# Patient Record
Sex: FEMALE | Race: BLACK OR AFRICAN AMERICAN | Employment: OTHER | URBAN - METROPOLITAN AREA
[De-identification: names, ages, dates, MRNs, and addresses within clinical notes are randomized per-mention and may not be internally consistent; named-entity substitution may affect disease eponyms.]

---

## 2017-07-11 ENCOUNTER — RECORDS - HEALTHEAST (OUTPATIENT)
Dept: LAB | Facility: CLINIC | Age: 37
End: 2017-07-11

## 2017-07-11 LAB
CHOLEST SERPL-MCNC: 212 MG/DL
FASTING STATUS PATIENT QL REPORTED: ABNORMAL
HDLC SERPL-MCNC: 72 MG/DL
LDLC SERPL CALC-MCNC: 120 MG/DL
TRIGL SERPL-MCNC: 98 MG/DL

## 2018-04-05 ENCOUNTER — RECORDS - HEALTHEAST (OUTPATIENT)
Dept: LAB | Facility: CLINIC | Age: 38
End: 2018-04-05

## 2018-04-06 LAB
BACTERIA SPEC CULT: NO GROWTH
C TRACH DNA SPEC QL PROBE+SIG AMP: NEGATIVE
N GONORRHOEA DNA SPEC QL NAA+PROBE: NEGATIVE

## 2018-04-09 LAB
BKR LAB AP ABNORMAL BLEEDING: NO
BKR LAB AP BIRTH CONTROL/HORMONES: NORMAL
BKR LAB AP CERVICAL APPEARANCE: NORMAL
BKR LAB AP GYN ADEQUACY: NORMAL
BKR LAB AP GYN INTERPRETATION: NORMAL
BKR LAB AP GYN OTHER FINDINGS: NORMAL
BKR LAB AP HPV REFLEX: NORMAL
BKR LAB AP LMP: NORMAL
BKR LAB AP PATIENT STATUS: NORMAL
BKR LAB AP PREVIOUS ABNORMAL: NORMAL
BKR LAB AP PREVIOUS NORMAL: NORMAL
HIGH RISK?: NO
PATH REPORT.COMMENTS IMP SPEC: NORMAL
RESULT FLAG (HE HISTORICAL CONVERSION): NORMAL

## 2019-04-01 ENCOUNTER — RECORDS - HEALTHEAST (OUTPATIENT)
Dept: LAB | Facility: CLINIC | Age: 39
End: 2019-04-01

## 2019-04-01 LAB
ANION GAP SERPL CALCULATED.3IONS-SCNC: 8 MMOL/L (ref 5–18)
BUN SERPL-MCNC: 11 MG/DL (ref 8–22)
CALCIUM SERPL-MCNC: 9.7 MG/DL (ref 8.5–10.5)
CHLORIDE BLD-SCNC: 106 MMOL/L (ref 98–107)
CHOLEST SERPL-MCNC: 191 MG/DL
CO2 SERPL-SCNC: 27 MMOL/L (ref 22–31)
CREAT SERPL-MCNC: 0.85 MG/DL (ref 0.6–1.1)
FASTING STATUS PATIENT QL REPORTED: NO
GFR SERPL CREATININE-BSD FRML MDRD: >60 ML/MIN/1.73M2
GLUCOSE BLD-MCNC: 79 MG/DL (ref 70–125)
HDLC SERPL-MCNC: 77 MG/DL
LDLC SERPL CALC-MCNC: 102 MG/DL
POTASSIUM BLD-SCNC: 4.5 MMOL/L (ref 3.5–5)
SODIUM SERPL-SCNC: 141 MMOL/L (ref 136–145)
TRIGL SERPL-MCNC: 58 MG/DL

## 2019-10-28 ENCOUNTER — AMBULATORY - HEALTHEAST (OUTPATIENT)
Dept: LAB | Facility: CLINIC | Age: 39
End: 2019-10-28

## 2019-10-28 DIAGNOSIS — I10 HYPERTENSION: ICD-10-CM

## 2019-10-28 LAB
CREAT 24H UR-MRATE: 1.7 G/24 HR (ref 0.8–1.8)
CREAT UR-MCNC: 192.5 MG/DL
PROTEIN TOTAL TIMED URINE MG/SPEC LHE: 105 MG/24HR (ref 0–150)
PROTEIN, RANDOM URINE - HISTORICAL: 12 MG/DL
SPECIMEN VOL UR: 875 ML
SPECIMEN VOL UR: 875 ML

## 2021-02-05 ENCOUNTER — RECORDS - HEALTHEAST (OUTPATIENT)
Dept: LAB | Facility: CLINIC | Age: 41
End: 2021-02-05

## 2021-02-05 LAB
CLUE CELLS: NORMAL
TRICHOMONAS, WET PREP: NORMAL
YEAST, WET PREP: NORMAL

## 2021-02-09 LAB
C TRACH DNA SPEC QL PROBE+SIG AMP: NEGATIVE
N GONORRHOEA DNA SPEC QL NAA+PROBE: NEGATIVE

## 2022-01-27 ENCOUNTER — LAB REQUISITION (OUTPATIENT)
Dept: LAB | Facility: CLINIC | Age: 42
End: 2022-01-27

## 2022-01-27 DIAGNOSIS — M25.462 EFFUSION, LEFT KNEE: ICD-10-CM

## 2022-01-27 LAB
ERYTHROCYTE [SEDIMENTATION RATE] IN BLOOD BY WESTERGREN METHOD: 23 MM/HR (ref 0–20)
URATE SERPL-MCNC: 3 MG/DL (ref 2–7.5)

## 2022-01-27 PROCEDURE — 85652 RBC SED RATE AUTOMATED: CPT | Performed by: PHYSICIAN ASSISTANT

## 2022-01-27 PROCEDURE — 84550 ASSAY OF BLOOD/URIC ACID: CPT | Performed by: PHYSICIAN ASSISTANT

## 2022-02-28 ENCOUNTER — LAB REQUISITION (OUTPATIENT)
Dept: LAB | Facility: CLINIC | Age: 42
End: 2022-02-28

## 2022-02-28 DIAGNOSIS — M25.562 PAIN IN LEFT KNEE: ICD-10-CM

## 2022-02-28 DIAGNOSIS — Z01.419 ENCOUNTER FOR GYNECOLOGICAL EXAMINATION (GENERAL) (ROUTINE) WITHOUT ABNORMAL FINDINGS: ICD-10-CM

## 2022-02-28 DIAGNOSIS — I10 ESSENTIAL (PRIMARY) HYPERTENSION: ICD-10-CM

## 2022-02-28 LAB
ANION GAP SERPL CALCULATED.3IONS-SCNC: 12 MMOL/L (ref 5–18)
BUN SERPL-MCNC: 9 MG/DL (ref 8–22)
CALCIUM SERPL-MCNC: 9.5 MG/DL (ref 8.5–10.5)
CHLORIDE BLD-SCNC: 107 MMOL/L (ref 98–107)
CHOLEST SERPL-MCNC: 194 MG/DL
CO2 SERPL-SCNC: 23 MMOL/L (ref 22–31)
CREAT SERPL-MCNC: 0.8 MG/DL (ref 0.6–1.1)
ERYTHROCYTE [SEDIMENTATION RATE] IN BLOOD BY WESTERGREN METHOD: 16 MM/HR (ref 0–20)
FASTING STATUS PATIENT QL REPORTED: NORMAL
GFR SERPL CREATININE-BSD FRML MDRD: >90 ML/MIN/1.73M2
GLUCOSE BLD-MCNC: 124 MG/DL (ref 70–125)
HDLC SERPL-MCNC: 62 MG/DL
LDLC SERPL CALC-MCNC: 115 MG/DL
POTASSIUM BLD-SCNC: 3.8 MMOL/L (ref 3.5–5)
RHEUMATOID FACT SER NEPH-ACNC: <15 IU/ML (ref 0–30)
SODIUM SERPL-SCNC: 142 MMOL/L (ref 136–145)
TRIGL SERPL-MCNC: 86 MG/DL

## 2022-02-28 PROCEDURE — 80048 BASIC METABOLIC PNL TOTAL CA: CPT | Performed by: PHYSICIAN ASSISTANT

## 2022-02-28 PROCEDURE — 85652 RBC SED RATE AUTOMATED: CPT | Performed by: PHYSICIAN ASSISTANT

## 2022-02-28 PROCEDURE — 86431 RHEUMATOID FACTOR QUANT: CPT | Performed by: PHYSICIAN ASSISTANT

## 2022-02-28 PROCEDURE — 80061 LIPID PANEL: CPT | Performed by: PHYSICIAN ASSISTANT

## 2022-02-28 PROCEDURE — 86038 ANTINUCLEAR ANTIBODIES: CPT | Performed by: PHYSICIAN ASSISTANT

## 2022-02-28 PROCEDURE — G0123 SCREEN CERV/VAG THIN LAYER: HCPCS | Performed by: PHYSICIAN ASSISTANT

## 2022-02-28 PROCEDURE — 87624 HPV HI-RISK TYP POOLED RSLT: CPT | Performed by: PHYSICIAN ASSISTANT

## 2022-03-02 LAB
ANA SER QL IF: NEGATIVE
HUMAN PAPILLOMA VIRUS 16 DNA: NEGATIVE
HUMAN PAPILLOMA VIRUS 18 DNA: NEGATIVE
HUMAN PAPILLOMA VIRUS FINAL DIAGNOSIS: NORMAL
HUMAN PAPILLOMA VIRUS OTHER HR: NEGATIVE

## 2022-03-04 LAB
BKR LAB AP GYN ADEQUACY: NORMAL
BKR LAB AP GYN INTERPRETATION: NORMAL
BKR LAB AP HPV REFLEX: NORMAL
BKR LAB AP LMP: NORMAL
BKR LAB AP PREVIOUS ABNORMAL: NORMAL
PATH REPORT.COMMENTS IMP SPEC: NORMAL
PATH REPORT.COMMENTS IMP SPEC: NORMAL
PATH REPORT.RELEVANT HX SPEC: NORMAL

## 2022-03-14 ENCOUNTER — TRANSCRIBE ORDERS (OUTPATIENT)
Dept: OTHER | Age: 42
End: 2022-03-14
Payer: COMMERCIAL

## 2022-03-14 DIAGNOSIS — M22.42 CHONDROMALACIA OF PATELLA, LEFT: ICD-10-CM

## 2022-03-14 DIAGNOSIS — M94.20 CHONDROMALACIA: Primary | ICD-10-CM

## 2022-03-23 ENCOUNTER — THERAPY VISIT (OUTPATIENT)
Dept: PHYSICAL THERAPY | Facility: CLINIC | Age: 42
End: 2022-03-23
Attending: PHYSICIAN ASSISTANT
Payer: COMMERCIAL

## 2022-03-23 DIAGNOSIS — M94.20 CHONDROMALACIA: ICD-10-CM

## 2022-03-23 DIAGNOSIS — M22.42 CHONDROMALACIA OF PATELLA, LEFT: ICD-10-CM

## 2022-03-23 PROCEDURE — 97110 THERAPEUTIC EXERCISES: CPT | Mod: GP | Performed by: PHYSICAL THERAPIST

## 2022-03-23 PROCEDURE — 97161 PT EVAL LOW COMPLEX 20 MIN: CPT | Mod: GP | Performed by: PHYSICAL THERAPIST

## 2022-03-23 ASSESSMENT — ACTIVITIES OF DAILY LIVING (ADL)
PAIN: THE SYMPTOM AFFECTS MY ACTIVITY MODERATELY
STIFFNESS: THE SYMPTOM AFFECTS MY ACTIVITY MODERATELY
SIT WITH YOUR KNEE BENT: ACTIVITY IS SOMEWHAT DIFFICULT
RISE FROM A CHAIR: ACTIVITY IS SOMEWHAT DIFFICULT
WALK: ACTIVITY IS FAIRLY DIFFICULT
HOW_WOULD_YOU_RATE_THE_OVERALL_FUNCTION_OF_YOUR_KNEE_DURING_YOUR_USUAL_DAILY_ACTIVITIES?: ABNORMAL
HOW_WOULD_YOU_RATE_THE_CURRENT_FUNCTION_OF_YOUR_KNEE_DURING_YOUR_USUAL_DAILY_ACTIVITIES_ON_A_SCALE_FROM_0_TO_100_WITH_100_BEING_YOUR_LEVEL_OF_KNEE_FUNCTION_PRIOR_TO_YOUR_INJURY_AND_0_BEING_THE_INABILITY_TO_PERFORM_ANY_OF_YOUR_USUAL_DAILY_ACTIVITIES?: 40
GIVING WAY, BUCKLING OR SHIFTING OF KNEE: THE SYMPTOM AFFECTS MY ACTIVITY MODERATELY
RAW_SCORE: 24
KNEE_ACTIVITY_OF_DAILY_LIVING_SCORE: 34.29
SWELLING: THE SYMPTOM AFFECTS MY ACTIVITY MODERATELY
GO UP STAIRS: ACTIVITY IS VERY DIFFICULT
LIMPING: THE SYMPTOM AFFECTS MY ACTIVITY MODERATELY
KNEEL ON THE FRONT OF YOUR KNEE: I AM UNABLE TO DO THE ACTIVITY
SQUAT: I AM UNABLE TO DO THE ACTIVITY
AS_A_RESULT_OF_YOUR_KNEE_INJURY,_HOW_WOULD_YOU_RATE_YOUR_CURRENT_LEVEL_OF_DAILY_ACTIVITY?: SEVERELY ABNORMAL
STAND: ACTIVITY IS FAIRLY DIFFICULT
KNEE_ACTIVITY_OF_DAILY_LIVING_SUM: 24
WEAKNESS: THE SYMPTOM AFFECTS MY ACTIVITY MODERATELY
GO DOWN STAIRS: ACTIVITY IS VERY DIFFICULT

## 2022-03-23 NOTE — PROGRESS NOTES
Physical Therapy Initial Evaluation  Subjective:  The history is provided by the patient. No  was used.   Therapist Generated HPI Evaluation  Problem details: 1/26/22. Pt started having L knee pain about 8 weeks ago. Denies specific REJI. Denies history of knee problems. Since onset of L knee pain, she has started experiencing R knee pain..         Type of problem:  Left knee.    This is a new condition.  Condition occurred with:  Insidious onset.  Where condition occurred: for unknown reasons.  Patient reports pain:  Anterior.  Pain is described as sharp and is intermittent.    Since onset symptoms are gradually improving.  Associated symptoms:  Other and edema (popping). Symptoms are exacerbated by ascending stairs, descending stairs, bending/squatting, other and walking (twisting/pivoting)  and relieved by rest and bracing/immobilizing.  Special tests included:  X-ray and MRI.    Restrictions due to condition include:  Working in normal job without restrictions.  Barriers include:  Stairs.    Patient Health History  Liat Jimenez being seen for L knee.          Pain is reported as 4/10 (up to 7/10) on pain scale.  General health as reported by patient is good.  Pertinent medical history includes: high blood pressure, overweight and smoking.   Red flags:  None as reported by patient.  Medical allergies: none.   Surgeries include:  None.    Current medications:  High blood pressure medication.    Current occupation is PCA.   Primary job tasks include:  Lifting/carrying, prolonged standing and other.   Other job/home tasks details: Walking.                                  Objective:  Standing Alignment:              Knee:  Genu valgus R and genu valgus L      Gait:    Gait Type:  Antalgic   Assistive Devices:  None  Deviations:  Knee:  Knee extension decr LAnkle:  Push off decr L                                                      Knee Evaluation:  ROM:    AROM      Extension:  Left: min  loss -/+    Right:  WNL  Flexion: Left: 118 - tight    Right: WNL  PROM    Hyperextension: Left: slight loss -    Right:  WNL    Flexion: Left: 124 - tight   Right:       Strength:         Quad Set Left:  Fair    Pain: +/-   Quad Set Right:  WNL    Pain: -        Edema:  Edema of the knee: mild edema L knee.            General     ROS    Assessment/Plan:    Patient is a 41 year old female with left side knee complaints.    Patient has the following significant findings with corresponding treatment plan.                Diagnosis 1:  L knee chondromalacia  Pain -  hot/cold therapy, manual therapy, splint/taping/bracing/orthotics, self management, education and home program  Decreased ROM/flexibility - manual therapy, therapeutic exercise and home program  Decreased strength - therapeutic exercise, therapeutic activities and home program  Decreased proprioception - neuro re-education, gait training, therapeutic activities and home program  Edema - cold therapy and self management/home program  Impaired gait - gait training, assistive devices and home program  Impaired muscle performance - neuro re-education and home program  Decreased function - therapeutic activities and home program    Therapy Evaluation Codes:   1) History comprised of:   Personal factors that impact the plan of care:      None.    Comorbidity factors that impact the plan of care are:      High blood pressure, Overweight and Smoking.     Medications impacting care: High blood pressure.  2) Examination of Body Systems comprised of:   Body structures and functions that impact the plan of care:      Knee.   Activity limitations that impact the plan of care are:      Cooking, Lifting, Sitting, Squatting/kneeling, Stairs, Standing, Walking and Working.  3) Clinical presentation characteristics are:   Stable/Uncomplicated.  4) Decision-Making    Low complexity using standardized patient assessment instrument and/or measureable assessment of functional  outcome.  Cumulative Therapy Evaluation is: Low complexity.    Previous and current functional limitations:  (See Goal Flow Sheet for this information)    Short term and Long term goals: (See Goal Flow Sheet for this information)     Communication ability:  Patient appears to be able to clearly communicate and understand verbal and written communication and follow directions correctly.  Treatment Explanation - The following has been discussed with the patient:   RX ordered/plan of care  Anticipated outcomes  Possible risks and side effects  This patient would benefit from PT intervention to resume normal activities.   Rehab potential is good.    Frequency:  1 X week, once daily  Duration:  for 6 weeks tapering to 1 X every other week over 4 weeks  Discharge Plan:  Achieve all LTG.  Independent in home treatment program.  Reach maximal therapeutic benefit.    Please refer to the daily flowsheet for treatment today, total treatment time and time spent performing 1:1 timed codes.

## 2022-03-24 PROBLEM — M22.42 CHONDROMALACIA OF PATELLA, LEFT: Status: ACTIVE | Noted: 2022-03-24

## 2022-03-24 NOTE — PROGRESS NOTES
[facility]    OUTPATIENT Physical Therapy ORTHOPEDIC EVALUATION  PLAN OF TREATMENT FOR OUTPATIENT REHABILITATION  (COMPLETE FOR INITIAL CLAIMS ONLY)  Patient's Last Name, First Name, M.I.  YOB: 1980  Liat Jimenez    Provider s Name:  [facility]   Medical Record No.  [id number]   Start of Care Date:  03/23/22   Onset Date:   01/26/22   Type:     _X__PT   ___OT Medical Diagnosis:    Encounter Diagnoses   Name Primary?     Chondromalacia      Chondromalacia of patella, left         Treatment Diagnosis:  L knee chondromalacia        Goals:     03/23/22 0500   Body Part   Goals listed below are for L knee   Goal #1   Goal #1 stairs   Previous Functional Level No restrictions   Current Functional Level Ascend/descend stairs;one step at a time;with a railing   Performance Level 1 flight   STG Target Performance Ascend stairs;in a normal reciprocal pattern   Performance Level 1/2 flight   Rationale to reach upper level of home safely;for safe community access to buildings   Due Date 04/20/22   LTG Target Performance Ascend/descend stairs;in a normal reciprocal pattern   Performance Level 1 flight   Rationale to reach upper level of home safely;to reach lower level of home safely;for safe community access to buildings   Due Date 06/16/22       Therapy Frequency:  1x/week for 6 weeks tapering to 1x every other week for 4 weeks  Predicted Duration of Therapy Intervention:  12 weeks    Arsen Marshall, PT                 I CERTIFY THE NEED FOR THESE SERVICES FURNISHED UNDER        THIS PLAN OF TREATMENT AND WHILE UNDER MY CARE     (Physician attestation of this document indicates review and certification of the therapy plan).                       Certification Date From:  03/23/22   Certification Date To:  06/16/22    Referring Provider:  Vilma Simons    Initial  Assessment        See Epic Evaluation SOC Date: 03/23/22

## 2022-03-28 NOTE — PROGRESS NOTES
[facility]    OUTPATIENT Physical Therapy ORTHOPEDIC EVALUATION  PLAN OF TREATMENT FOR OUTPATIENT REHABILITATION  (COMPLETE FOR INITIAL CLAIMS ONLY)  Patient's Last Name, First Name, M.I.  YOB: 1980  Liat Jimenez    Provider s Name:  [facility]   Medical Record No.  [id number]   Start of Care Date:  03/23/22   Onset Date:   01/26/22   Type:     _X__PT   ___OT Medical Diagnosis:    Encounter Diagnoses   Name Primary?     Chondromalacia      Chondromalacia of patella, left         Treatment Diagnosis:  L knee chondromalacia        Goals:     03/23/22 0500   Body Part   Goals listed below are for L knee   Goal #1   Goal #1 stairs   Previous Functional Level No restrictions   Current Functional Level Ascend/descend stairs;one step at a time;with a railing   Performance Level 1 flight   STG Target Performance Ascend stairs;in a normal reciprocal pattern   Performance Level 1/2 flight   Rationale to reach upper level of home safely;for safe community access to buildings   Due Date 04/20/22   LTG Target Performance Ascend/descend stairs;in a normal reciprocal pattern   Performance Level 1 flight   Rationale to reach upper level of home safely;to reach lower level of home safely;for safe community access to buildings   Due Date 06/16/22       Therapy Frequency:  1x/week for 6 weeks tapering to 1x every other week for 4 weeks  Predicted Duration of Therapy Intervention:  12 weeks    Arsen Marshall, PT                 I CERTIFY THE NEED FOR THESE SERVICES FURNISHED UNDER        THIS PLAN OF TREATMENT AND WHILE UNDER MY CARE .             Physician Signature               Date    X_____________________________________________________                             Certification Date From:  03/23/22   Certification Date To:  06/16/22    Referring Provider:  Vilma Menjivar  Kristyn    Initial Assessment        See Epic Evaluation SOC Date: 03/23/22

## 2022-04-07 ENCOUNTER — THERAPY VISIT (OUTPATIENT)
Dept: PHYSICAL THERAPY | Facility: CLINIC | Age: 42
End: 2022-04-07
Payer: COMMERCIAL

## 2022-04-07 DIAGNOSIS — M22.42 CHONDROMALACIA OF PATELLA, LEFT: ICD-10-CM

## 2022-04-07 PROCEDURE — 97110 THERAPEUTIC EXERCISES: CPT | Mod: GP | Performed by: PHYSICAL THERAPIST

## 2022-06-06 PROBLEM — M22.42 CHONDROMALACIA OF PATELLA, LEFT: Status: RESOLVED | Noted: 2022-03-24 | Resolved: 2022-06-06

## 2022-06-06 NOTE — PROGRESS NOTES
Discharge Note    Progress reporting period is from initial evaluation date (please see noted date below) to Apr 7, 2022.  Linked Episodes   Type: Episode: Status: Noted: Resolved: Last update: Updated by:   PHYSICAL THERAPY L knee 03/23/2022 Active 3/23/2022  4/7/2022  8:39 AM Arsen Marshall, PT      Comments:       Liat failed to follow up and current status is unknown.  Please see information below for last relevant information on current status.  Patient seen for 2 visits.    SUBJECTIVE  Subjective changes noted by patient:  Patient reports pressure feels better in knee but still getting popping when getting out of car and weightbearing. Patient reports she has not been as consistent with HEP because she keeps forgetting to do them. Doing knee extension mobs about 1x per day and SLR strengthening a few times a week. Most pain with prolonged standing (especially at work) and walking. Still going up/down stairs one at a time.  .  Current pain level is  (1-2/10).     Previous pain level was  4/10.   Changes in function:  Yes (See Goal flowsheet attached for changes in current functional level)  Adverse reaction to treatment or activity: None    OBJECTIVE  Changes noted in objective findings: left knee AROM: 5-0-123; decreased pain post treatment especially with walking     ASSESSMENT/PLAN  Diagnosis: L knee chondromalacia   Updated problem list and treatment plan:     STG/LTGs have been met or progress has been made towards goals:  Yes, please see goal flowsheet for most current information  Assessment of Progress: current status is unknown.    Last current status: Pt is progressing as expected   Self Management Plans:  HEP  I have re-evaluated this patient and find that the nature, scope, duration and intensity of the therapy is appropriate for the medical condition of the patient.  Liat continues to require the following intervention to meet STG and LTG's:  HEP.    Recommendations:  Discharge with  current home program.  Patient to follow up with MD as needed.    Please refer to the daily flowsheet for treatment today, total treatment time and time spent performing 1:1 timed codes.

## 2023-11-07 ENCOUNTER — LAB REQUISITION (OUTPATIENT)
Dept: LAB | Facility: CLINIC | Age: 43
End: 2023-11-07

## 2023-11-07 DIAGNOSIS — I10 ESSENTIAL (PRIMARY) HYPERTENSION: ICD-10-CM

## 2023-11-07 LAB
ANION GAP SERPL CALCULATED.3IONS-SCNC: 14 MMOL/L (ref 7–15)
BUN SERPL-MCNC: 12.9 MG/DL (ref 6–20)
CALCIUM SERPL-MCNC: 9.3 MG/DL (ref 8.6–10)
CHLORIDE SERPL-SCNC: 102 MMOL/L (ref 98–107)
CREAT SERPL-MCNC: 0.89 MG/DL (ref 0.51–0.95)
DEPRECATED HCO3 PLAS-SCNC: 24 MMOL/L (ref 22–29)
EGFRCR SERPLBLD CKD-EPI 2021: 82 ML/MIN/1.73M2
GLUCOSE SERPL-MCNC: 90 MG/DL (ref 70–99)
POTASSIUM SERPL-SCNC: 3.8 MMOL/L (ref 3.4–5.3)
SODIUM SERPL-SCNC: 140 MMOL/L (ref 135–145)

## 2023-11-07 PROCEDURE — 80048 BASIC METABOLIC PNL TOTAL CA: CPT | Performed by: STUDENT IN AN ORGANIZED HEALTH CARE EDUCATION/TRAINING PROGRAM

## 2024-08-23 ENCOUNTER — LAB REQUISITION (OUTPATIENT)
Dept: LAB | Facility: CLINIC | Age: 44
End: 2024-08-23

## 2024-08-23 DIAGNOSIS — I10 ESSENTIAL (PRIMARY) HYPERTENSION: ICD-10-CM

## 2024-08-23 LAB
ANION GAP SERPL CALCULATED.3IONS-SCNC: 14 MMOL/L (ref 7–15)
BUN SERPL-MCNC: 11.5 MG/DL (ref 6–20)
CALCIUM SERPL-MCNC: 9.1 MG/DL (ref 8.8–10.4)
CHLORIDE SERPL-SCNC: 105 MMOL/L (ref 98–107)
CHOLEST SERPL-MCNC: 191 MG/DL
CREAT SERPL-MCNC: 1 MG/DL (ref 0.51–0.95)
EGFRCR SERPLBLD CKD-EPI 2021: 71 ML/MIN/1.73M2
FASTING STATUS PATIENT QL REPORTED: ABNORMAL
FASTING STATUS PATIENT QL REPORTED: ABNORMAL
GLUCOSE SERPL-MCNC: 122 MG/DL (ref 70–99)
HCO3 SERPL-SCNC: 22 MMOL/L (ref 22–29)
HDLC SERPL-MCNC: 47 MG/DL
LDLC SERPL CALC-MCNC: 125 MG/DL
NONHDLC SERPL-MCNC: 144 MG/DL
POTASSIUM SERPL-SCNC: 3.2 MMOL/L (ref 3.4–5.3)
SODIUM SERPL-SCNC: 141 MMOL/L (ref 135–145)
TRIGL SERPL-MCNC: 94 MG/DL

## 2024-08-23 PROCEDURE — 80048 BASIC METABOLIC PNL TOTAL CA: CPT | Performed by: PHYSICIAN ASSISTANT

## 2024-08-23 PROCEDURE — 80061 LIPID PANEL: CPT | Performed by: PHYSICIAN ASSISTANT

## 2024-10-25 ENCOUNTER — LAB REQUISITION (OUTPATIENT)
Dept: LAB | Facility: CLINIC | Age: 44
End: 2024-10-25

## 2024-10-25 DIAGNOSIS — E87.6 HYPOKALEMIA: ICD-10-CM

## 2024-10-25 LAB — POTASSIUM SERPL-SCNC: 3.6 MMOL/L (ref 3.4–5.3)

## 2024-10-25 PROCEDURE — 84132 ASSAY OF SERUM POTASSIUM: CPT | Performed by: PHYSICIAN ASSISTANT

## 2025-07-17 ENCOUNTER — LAB REQUISITION (OUTPATIENT)
Dept: LAB | Facility: CLINIC | Age: 45
End: 2025-07-17

## 2025-07-17 DIAGNOSIS — E78.2 MIXED HYPERLIPIDEMIA: ICD-10-CM

## 2025-07-17 DIAGNOSIS — I10 ESSENTIAL (PRIMARY) HYPERTENSION: ICD-10-CM

## 2025-07-17 LAB
ALBUMIN SERPL BCG-MCNC: 4.2 G/DL (ref 3.5–5.2)
ALP SERPL-CCNC: 50 U/L (ref 40–150)
ALT SERPL W P-5'-P-CCNC: 16 U/L (ref 0–50)
ANION GAP SERPL CALCULATED.3IONS-SCNC: 11 MMOL/L (ref 7–15)
AST SERPL W P-5'-P-CCNC: 22 U/L (ref 0–45)
BILIRUB SERPL-MCNC: 0.8 MG/DL
BUN SERPL-MCNC: 12.8 MG/DL (ref 6–20)
CALCIUM SERPL-MCNC: 9.6 MG/DL (ref 8.8–10.4)
CHLORIDE SERPL-SCNC: 104 MMOL/L (ref 98–107)
CHOLEST SERPL-MCNC: 199 MG/DL
CREAT SERPL-MCNC: 0.97 MG/DL (ref 0.51–0.95)
EGFRCR SERPLBLD CKD-EPI 2021: 73 ML/MIN/1.73M2
FASTING STATUS PATIENT QL REPORTED: YES
FASTING STATUS PATIENT QL REPORTED: YES
GLUCOSE SERPL-MCNC: 97 MG/DL (ref 70–99)
HCO3 SERPL-SCNC: 26 MMOL/L (ref 22–29)
HDLC SERPL-MCNC: 60 MG/DL
LDLC SERPL CALC-MCNC: 128 MG/DL
NONHDLC SERPL-MCNC: 139 MG/DL
POTASSIUM SERPL-SCNC: 3.4 MMOL/L (ref 3.4–5.3)
PROT SERPL-MCNC: 6.8 G/DL (ref 6.4–8.3)
SODIUM SERPL-SCNC: 141 MMOL/L (ref 135–145)
TRIGL SERPL-MCNC: 53 MG/DL

## 2025-07-17 PROCEDURE — 84155 ASSAY OF PROTEIN SERUM: CPT | Performed by: PHYSICIAN ASSISTANT

## 2025-07-17 PROCEDURE — 84478 ASSAY OF TRIGLYCERIDES: CPT | Performed by: PHYSICIAN ASSISTANT
